# Patient Record
Sex: FEMALE | URBAN - METROPOLITAN AREA
[De-identification: names, ages, dates, MRNs, and addresses within clinical notes are randomized per-mention and may not be internally consistent; named-entity substitution may affect disease eponyms.]

---

## 2022-08-09 ENCOUNTER — WEB ENCOUNTER (OUTPATIENT)
Dept: URBAN - METROPOLITAN AREA CLINIC 111 | Facility: CLINIC | Age: 27
End: 2022-08-09

## 2022-08-12 ENCOUNTER — OFFICE VISIT (OUTPATIENT)
Dept: URBAN - METROPOLITAN AREA CLINIC 111 | Facility: CLINIC | Age: 27
End: 2022-08-12
Payer: COMMERCIAL

## 2022-08-12 ENCOUNTER — DASHBOARD ENCOUNTERS (OUTPATIENT)
Age: 27
End: 2022-08-12

## 2022-08-12 VITALS
BODY MASS INDEX: 31.85 KG/M2 | SYSTOLIC BLOOD PRESSURE: 117 MMHG | HEIGHT: 66 IN | HEART RATE: 78 BPM | DIASTOLIC BLOOD PRESSURE: 79 MMHG | WEIGHT: 198.2 LBS | TEMPERATURE: 97.5 F

## 2022-08-12 DIAGNOSIS — R04.2 COUGHING UP BLOOD: ICD-10-CM

## 2022-08-12 DIAGNOSIS — R10.9 ABDOMINAL CRAMPING: ICD-10-CM

## 2022-08-12 DIAGNOSIS — K92.0 HEMATEMESIS WITHOUT NAUSEA: ICD-10-CM

## 2022-08-12 PROCEDURE — 99204 OFFICE O/P NEW MOD 45 MIN: CPT | Performed by: NURSE PRACTITIONER

## 2022-08-12 RX ORDER — DICYCLOMINE HYDROCHLORIDE 20 MG/1
1 TABLET TABLET ORAL THREE TIMES A DAY
Qty: 30 TABLET | Refills: 1 | OUTPATIENT

## 2022-08-12 NOTE — HPI-TODAY'S VISIT:
27 yo female presents following up after ER visit. Pt had COVID-19 infection after July 4th, been coughing since then. She had one episode of coughing blood and vomited blood, went to [facility] the next day on 7/26/22. Cxray was nml per patient. Labs rev'd wbc 6.4, hbg 15. Denies any current gi symptoms. Reports occasional abd cramping after eating and has loose bowel for last 2 years. Denies fever, chills, abd pain nausea or vomiting, dysphagia, odynophagia, melena, hematochezia or weight loss. Has intermmittent dry cough. Has fh grandfather colon cancer.